# Patient Record
Sex: FEMALE | Race: WHITE | NOT HISPANIC OR LATINO | Employment: UNEMPLOYED | RURAL
[De-identification: names, ages, dates, MRNs, and addresses within clinical notes are randomized per-mention and may not be internally consistent; named-entity substitution may affect disease eponyms.]

---

## 2017-08-23 ENCOUNTER — OFFICE VISIT (OUTPATIENT)
Dept: CARDIOLOGY | Facility: CLINIC | Age: 42
End: 2017-08-23

## 2017-08-23 VITALS
SYSTOLIC BLOOD PRESSURE: 100 MMHG | WEIGHT: 214 LBS | DIASTOLIC BLOOD PRESSURE: 78 MMHG | HEIGHT: 62 IN | BODY MASS INDEX: 39.38 KG/M2 | HEART RATE: 84 BPM

## 2017-08-23 DIAGNOSIS — E78.49 OTHER HYPERLIPIDEMIA: ICD-10-CM

## 2017-08-23 DIAGNOSIS — R00.2 PALPITATIONS: Primary | ICD-10-CM

## 2017-08-23 DIAGNOSIS — Z79.4 TYPE 2 DIABETES MELLITUS WITHOUT COMPLICATION, WITH LONG-TERM CURRENT USE OF INSULIN (HCC): ICD-10-CM

## 2017-08-23 DIAGNOSIS — Z72.0 TOBACCO ABUSE: ICD-10-CM

## 2017-08-23 DIAGNOSIS — E11.9 TYPE 2 DIABETES MELLITUS WITHOUT COMPLICATION, WITH LONG-TERM CURRENT USE OF INSULIN (HCC): ICD-10-CM

## 2017-08-23 DIAGNOSIS — R07.89 OTHER CHEST PAIN: ICD-10-CM

## 2017-08-23 DIAGNOSIS — R06.02 SHORTNESS OF BREATH: ICD-10-CM

## 2017-08-23 DIAGNOSIS — I10 ESSENTIAL HYPERTENSION: ICD-10-CM

## 2017-08-23 PROBLEM — E78.5 HYPERLIPEMIA: Status: ACTIVE | Noted: 2017-08-23

## 2017-08-23 PROBLEM — R07.9 CHEST PAIN: Status: ACTIVE | Noted: 2017-08-23

## 2017-08-23 PROCEDURE — 99214 OFFICE O/P EST MOD 30 MIN: CPT | Performed by: NURSE PRACTITIONER

## 2017-08-23 PROCEDURE — 99406 BEHAV CHNG SMOKING 3-10 MIN: CPT | Performed by: NURSE PRACTITIONER

## 2017-08-23 RX ORDER — IBUPROFEN 800 MG/1
800 TABLET ORAL EVERY 6 HOURS PRN
COMMUNITY

## 2017-08-23 RX ORDER — GABAPENTIN 800 MG/1
800 TABLET ORAL 4 TIMES DAILY
COMMUNITY
End: 2018-02-28 | Stop reason: ALTCHOICE

## 2017-08-23 RX ORDER — NITROGLYCERIN 0.4 MG/1
TABLET SUBLINGUAL
Qty: 100 TABLET | Refills: 11 | Status: SHIPPED | OUTPATIENT
Start: 2017-08-23

## 2017-08-23 RX ORDER — BACLOFEN 10 MG/1
10 TABLET ORAL 2 TIMES DAILY
COMMUNITY

## 2017-08-23 RX ORDER — NICOTINE 21 MG/24HR
1 PATCH, TRANSDERMAL 24 HOURS TRANSDERMAL EVERY 24 HOURS
Qty: 28 PATCH | Refills: 1 | Status: SHIPPED | OUTPATIENT
Start: 2017-08-23 | End: 2018-02-28 | Stop reason: ALTCHOICE

## 2017-08-23 RX ORDER — LISINOPRIL 30 MG/1
30 TABLET ORAL DAILY
COMMUNITY
End: 2018-02-28 | Stop reason: DRUGHIGH

## 2017-08-23 NOTE — PROGRESS NOTES
Chief Complaint   Patient presents with   • Follow-up     For cardiac management.   • Chest Pain     She states having pressure in mid chest.   • Palpitations     States having a lot of palpitations, reports PCP started her on Lyrica, palpitations got worse. She reports has stopped the Lyrica. Last labs couple weeks ago at [facility].   • Shortness of Breath     She reports having increase in shortness of breath.   • Dizziness     she reports same as before.       Cardiac Complaints  palpitations      Subjective   Ayah Messer is a 42 y.o. female diabetes, hypertension, and palpitations.  Few years ago, she got admitted to [facility] and apparently underwent cardiac catheterization.  Details not available to me,  but according to her no intervention was not.  She had not had any symptoms for a while but she started noticing an increase in the palpitation and increasing chest tightness associated with palpitations and shortness of breath.  Holter done before consult showed PVCs.  At consult, coreg was added, since CRP was elevated lipids were treated aggressively with crestor increased to 20mg.  Cardiac workup with stress and echo were advised which showed no ischemic burden, good LVEF, and no significant valvular concerns. She returns today for follow up and reports she has been having more issues with shortness of breath which she states has worsened recently.  She does report having PFTs in the past with allergy and asthma, and was diagnosed with asthma at that time.  She also has a pain in the center of her chest late in the evening that will lasts a couple hours and then will go away. She has not used anything for intervention.  She was recently started on lyrica by PCP for control of neuropathy.  She states at that time her palpitations worsened and then have slowly gotten better since the discontinuation of medication.  She continues to have dizziness but this has been the same for sometime.  Labs are done with PCP as  well as refills.  She states her diabetes has not been well controlled and she will be following with endocrine soon, most recent AIC of over 9.  Labs showed recent elevation in triglycerides were elevated and medication changes were at that time. Unfortunately, she has not quit smoking and is still smoking about a pack a day.        Cardiac History  Past Surgical History:   Procedure Laterality Date   • CARDIAC CATHETERIZATION      ([place]) Normal as per pt   • CARDIOVASCULAR STRESS TEST  10/12/2016    EF 65%, no ischemia   •  SECTION     • CONVERTED (HISTORICAL) HOLTER  2016   • CONVERTED (HISTORICAL) HOLTER  2016    @[facility] HR 93 BPM. Frq PVC's (10.6%)   • ECHO - CONVERTED  10/12/2016    EF 55-60%, no AS, mild MR   • HYSTERECTOMY         Current Outpatient Prescriptions   Medication Sig Dispense Refill   • baclofen (LIORESAL) 10 MG tablet Take 10 mg by mouth 2 (Two) Times a Day.     • Canagliflozin (INVOKANA) 300 MG tablet Take 300 mg by mouth Daily.     • carvedilol (COREG) 6.25 MG tablet Take 1 tablet by mouth 2 (Two) Times a Day. 60 tablet 11   • gabapentin (NEURONTIN) 800 MG tablet Take 800 mg by mouth 4 (Four) Times a Day.     • ibuprofen (ADVIL,MOTRIN) 800 MG tablet Take 800 mg by mouth Every 6 (Six) Hours As Needed for Mild Pain .     • insulin aspart (novoLOG) 100 UNIT/ML injection Inject  under the skin 3 (Three) Times a Day Before Meals. Per sliding scale     • Insulin Glargine (BASAGLAR KWIKPEN SC) Inject 52 Units under the skin 2 (Two) Times a Day.     • lisinopril (PRINIVIL,ZESTRIL) 30 MG tablet Take 30 mg by mouth Daily.     • rosuvastatin (CRESTOR) 20 MG tablet Take 1 tablet by mouth Daily. 30 tablet 11   • vitamin D (ERGOCALCIFEROL) 89226 UNITS capsule capsule Take 50,000 Units by mouth 1 (One) Time Per Week. 4 times a week     • nicotine (EQ NICOTINE) 21 MG/24HR patch Place 1 patch on the skin Daily. 28 patch 1   • nitroglycerin (NITROSTAT) 0.4 MG SL tablet 1 under the  "tongue as needed for angina, may repeat q5mins for up three doses 100 tablet 11     No current facility-administered medications for this visit.        Codeine    Past Medical History:   Diagnosis Date   • Diabetes mellitus    • Diabetic neuropathy    • Hyperlipidemia    • Hypertension        Social History     Social History   • Marital status:      Spouse name: N/A   • Number of children: N/A   • Years of education: N/A     Occupational History   • Not on file.     Social History Main Topics   • Smoking status: Current Every Day Smoker     Packs/day: 1.00     Types: Cigarettes     Start date: 9/29/1995   • Smokeless tobacco: Never Used      Comment: Has tried chantex and patches, nothing helped.   • Alcohol use No   • Drug use: No   • Sexual activity: Not on file     Other Topics Concern   • Not on file     Social History Narrative       Family History   Problem Relation Age of Onset   • No Known Problems Mother    • Hypertension Father    • Diabetes Father    • Diabetes Sister        Review of Systems   Constitution: Negative for weakness and malaise/fatigue.   Cardiovascular: Positive for chest pain and palpitations. Negative for dyspnea on exertion, irregular heartbeat and syncope.   Respiratory: Positive for shortness of breath. Negative for wheezing.    Gastrointestinal: Negative for anorexia, flatus, heartburn and nausea.   Genitourinary: Negative for dysuria, hesitancy and nocturia.   Neurological: Positive for dizziness. Negative for focal weakness and light-headedness.   Psychiatric/Behavioral: Negative for altered mental status and depression.       DiabetesYes  Thyroidnormal    Objective     /78 (BP Location: Right arm)  Pulse 84  Ht 62\" (157.5 cm)  Wt 214 lb (97.1 kg)  BMI 39.14 kg/m2    Physical Exam   Constitutional: She is oriented to person, place, and time. She appears well-developed and well-nourished.   HENT:   Head: Normocephalic and atraumatic.   Eyes: EOM are normal. Pupils " are equal, round, and reactive to light.   Neck: Normal range of motion. Neck supple.   Cardiovascular: Normal rate and regular rhythm.    Murmur heard.  Pulmonary/Chest: Effort normal and breath sounds normal.   Abdominal: Soft.   Musculoskeletal: Normal range of motion.   Neurological: She is alert and oriented to person, place, and time.   Skin: Skin is warm and dry.   Psychiatric: She has a normal mood and affect. Her behavior is normal.       Procedures    Assessment/Plan     HR and BP are stable at present.  Most recent stress findings which showed normal LV function without ischemic burden and echo which good showed LVEF and valvular function discussed with patient.  She does have more palpitations than before and says that she feels like most of the cardiac symptoms she is having is related to this. Event recorder will be advised since she is having more palpitations than before to rule out any runs of SVT or more frequent PVCs that may be causing concerns.  She does admit to a lot of caffeine use and was encouraged to limit her intake.  Chest pain she reports is very atypical and comes in the evening as she is resting.  She was encouraged to discuss with you about GI referral as she reports esophageal yeast in the past and is also a diabetic, so unsure if gastroparesis is playing a roll.  NTG SL will be given to patient to use as needed for chest discomfort.  She has unfortunately continued to smoke and we discussed smoking cessation for greater than 3 minutes.  Nicotine patches will be sent as she is willing to try.  Patient does also report history of asthma and has had PFTs in the past but it has been sometime.  She was encouraged to discuss repeat PFTs with you.  She says diabetes has been hard to control and will be seeing endocrinology soon in regards.  Good cardiac ADA diet with limited carbs advised.  6 month follow up advised or sooner if needed.  If problems should continue, she is advised to call  for futher recommendations.      Problems Addressed this Visit        Cardiovascular and Mediastinum    Palpitations - Primary    Relevant Orders    Cardiac Event Monitor    HTN (hypertension)    Relevant Medications    lisinopril (PRINIVIL,ZESTRIL) 30 MG tablet    Hyperlipemia       Respiratory    Shortness of breath       Endocrine    Type 2 diabetes mellitus without complication, with long-term current use of insulin    Relevant Medications    insulin aspart (novoLOG) 100 UNIT/ML injection    Insulin Glargine (BASAGLAR KWIKPEN SC)       Nervous and Auditory    Chest pain       Other    Tobacco abuse    Relevant Medications    nicotine (EQ NICOTINE) 21 MG/24HR patch                  Electronically signed by LUIS Rollins August 23, 2017 5:39 PM

## 2017-08-31 ENCOUNTER — TELEPHONE (OUTPATIENT)
Dept: CARDIOLOGY | Facility: CLINIC | Age: 42
End: 2017-08-31

## 2017-08-31 DIAGNOSIS — R00.2 PALPITATION: ICD-10-CM

## 2017-08-31 DIAGNOSIS — I49.3 PVC (PREMATURE VENTRICULAR CONTRACTION): Primary | ICD-10-CM

## 2017-09-06 DIAGNOSIS — R00.2 PALPITATION: ICD-10-CM

## 2017-09-06 DIAGNOSIS — I49.3 PVC (PREMATURE VENTRICULAR CONTRACTION): ICD-10-CM

## 2017-09-15 ENCOUNTER — OUTSIDE FACILITY SERVICE (OUTPATIENT)
Dept: CARDIOLOGY | Facility: CLINIC | Age: 42
End: 2017-09-15

## 2017-09-15 PROCEDURE — 93272 ECG/REVIEW INTERPRET ONLY: CPT | Performed by: INTERNAL MEDICINE

## 2017-09-19 RX ORDER — FLECAINIDE ACETATE 50 MG/1
50 TABLET ORAL 2 TIMES DAILY
Qty: 60 TABLET | Refills: 3 | Status: SHIPPED | OUTPATIENT
Start: 2017-09-19 | End: 2017-09-19 | Stop reason: SDUPTHER

## 2017-09-19 RX ORDER — FLECAINIDE ACETATE 50 MG/1
50 TABLET ORAL 2 TIMES DAILY
Qty: 60 TABLET | Refills: 3 | Status: SHIPPED | OUTPATIENT
Start: 2017-09-19 | End: 2018-02-14 | Stop reason: SDUPTHER

## 2017-09-22 ENCOUNTER — TELEPHONE (OUTPATIENT)
Dept: CARDIOLOGY | Facility: CLINIC | Age: 42
End: 2017-09-22

## 2017-09-22 NOTE — TELEPHONE ENCOUNTER
After reviewing EKG from outside clinic. Patient is in NSR with first degree block and normal QT.  As long as her palpitations have improved, we will continue with current tambocor dosing at 50mg BID.

## 2017-09-25 NOTE — TELEPHONE ENCOUNTER
Patient notified of recommendations to continue Tambocor 50mg bid. Verbalizes understanding of instruction's.

## 2018-02-16 RX ORDER — FLECAINIDE ACETATE 50 MG/1
TABLET ORAL
Qty: 60 TABLET | Refills: 3 | Status: SHIPPED | OUTPATIENT
Start: 2018-02-16 | End: 2018-09-16 | Stop reason: SDUPTHER

## 2018-02-28 ENCOUNTER — OFFICE VISIT (OUTPATIENT)
Dept: CARDIOLOGY | Facility: CLINIC | Age: 43
End: 2018-02-28

## 2018-02-28 VITALS
HEIGHT: 62 IN | WEIGHT: 217 LBS | SYSTOLIC BLOOD PRESSURE: 110 MMHG | HEART RATE: 71 BPM | DIASTOLIC BLOOD PRESSURE: 74 MMHG | BODY MASS INDEX: 39.93 KG/M2

## 2018-02-28 DIAGNOSIS — E83.42 HYPOMAGNESEMIA: ICD-10-CM

## 2018-02-28 DIAGNOSIS — Z72.0 TOBACCO ABUSE: ICD-10-CM

## 2018-02-28 DIAGNOSIS — R07.89 CHEST PRESSURE: Primary | ICD-10-CM

## 2018-02-28 DIAGNOSIS — Z79.4 TYPE 2 DIABETES MELLITUS WITHOUT COMPLICATION, WITH LONG-TERM CURRENT USE OF INSULIN (HCC): ICD-10-CM

## 2018-02-28 DIAGNOSIS — R06.02 SHORTNESS OF BREATH: ICD-10-CM

## 2018-02-28 DIAGNOSIS — E78.00 HYPERCHOLESTEREMIA: ICD-10-CM

## 2018-02-28 DIAGNOSIS — E88.81 METABOLIC SYNDROME: ICD-10-CM

## 2018-02-28 DIAGNOSIS — R00.2 PALPITATIONS: ICD-10-CM

## 2018-02-28 DIAGNOSIS — I10 ESSENTIAL HYPERTENSION: ICD-10-CM

## 2018-02-28 DIAGNOSIS — E11.9 TYPE 2 DIABETES MELLITUS WITHOUT COMPLICATION, WITH LONG-TERM CURRENT USE OF INSULIN (HCC): ICD-10-CM

## 2018-02-28 PROBLEM — E78.5 HYPERLIPEMIA: Status: RESOLVED | Noted: 2017-08-23 | Resolved: 2018-02-28

## 2018-02-28 PROCEDURE — 99214 OFFICE O/P EST MOD 30 MIN: CPT | Performed by: INTERNAL MEDICINE

## 2018-02-28 PROCEDURE — 93000 ELECTROCARDIOGRAM COMPLETE: CPT | Performed by: INTERNAL MEDICINE

## 2018-02-28 RX ORDER — FLUCONAZOLE 200 MG/1
200 TABLET ORAL DAILY PRN
COMMUNITY
End: 2023-02-23

## 2018-02-28 RX ORDER — PREGABALIN 100 MG/1
200 CAPSULE ORAL 3 TIMES DAILY
COMMUNITY

## 2018-02-28 RX ORDER — LISINOPRIL 10 MG/1
10 TABLET ORAL DAILY
COMMUNITY
End: 2018-05-01 | Stop reason: DRUGHIGH

## 2018-02-28 RX ORDER — FLUOXETINE HYDROCHLORIDE 20 MG/1
20 CAPSULE ORAL DAILY
COMMUNITY
End: 2023-02-23

## 2018-02-28 RX ORDER — HYDROCHLOROTHIAZIDE 25 MG/1
25 TABLET ORAL DAILY
COMMUNITY

## 2018-02-28 RX ORDER — ALBUTEROL SULFATE 90 UG/1
2 AEROSOL, METERED RESPIRATORY (INHALATION) EVERY 4 HOURS PRN
COMMUNITY

## 2018-02-28 RX ORDER — TRAMADOL HYDROCHLORIDE 50 MG/1
50 TABLET ORAL 2 TIMES DAILY PRN
COMMUNITY

## 2018-03-01 NOTE — PROGRESS NOTES
Body mass index is 39.68 kg/(m^2).  Talked to her about diet exercise and weight reduction.  Talked to her about different diets that she can use.

## 2018-04-30 ENCOUNTER — TELEPHONE (OUTPATIENT)
Dept: CARDIOLOGY | Facility: CLINIC | Age: 43
End: 2018-04-30

## 2018-05-01 RX ORDER — LISINOPRIL 10 MG/1
10 TABLET ORAL 2 TIMES DAILY
Qty: 60 TABLET | Refills: 6 | Status: SHIPPED | OUTPATIENT
Start: 2018-05-01

## 2018-05-03 ENCOUNTER — TELEPHONE (OUTPATIENT)
Dept: CARDIOLOGY | Facility: CLINIC | Age: 43
End: 2018-05-03

## 2018-05-03 NOTE — TELEPHONE ENCOUNTER
Patient called reporting that since increasing lisinopril to BID per stress recommendations she has not noticed an improvement in symptoms: still having shortness of breath, pressure in chest, headache, fatigue. Your recommendation was to schedule for elective cath if symptoms persist. Okay to go ahead and schedule?

## 2018-05-03 NOTE — TELEPHONE ENCOUNTER
I spoke with patient.  She does want to proceed with cardiac catheterization.  I offered to schedule on 5/10/18.  Patient wants to wait until 5/17/18.  I will schedule, PA with [facility], and notify patient of instructions.

## 2018-05-17 ENCOUNTER — OUTSIDE FACILITY SERVICE (OUTPATIENT)
Dept: CARDIOLOGY | Facility: CLINIC | Age: 43
End: 2018-05-17

## 2018-05-17 PROCEDURE — 93458 L HRT ARTERY/VENTRICLE ANGIO: CPT | Performed by: INTERNAL MEDICINE

## 2018-09-17 RX ORDER — FLECAINIDE ACETATE 50 MG/1
50 TABLET ORAL 2 TIMES DAILY
Qty: 60 TABLET | Refills: 0 | Status: SHIPPED | OUTPATIENT
Start: 2018-09-17

## 2023-02-23 ENCOUNTER — OFFICE VISIT (OUTPATIENT)
Dept: PODIATRY | Facility: CLINIC | Age: 48
End: 2023-02-23
Payer: MEDICAID

## 2023-02-23 VITALS
OXYGEN SATURATION: 100 % | DIASTOLIC BLOOD PRESSURE: 82 MMHG | HEIGHT: 62 IN | WEIGHT: 146 LBS | TEMPERATURE: 97.8 F | HEART RATE: 81 BPM | SYSTOLIC BLOOD PRESSURE: 121 MMHG | BODY MASS INDEX: 26.87 KG/M2

## 2023-02-23 DIAGNOSIS — E11.42 TYPE 2 DIABETES MELLITUS WITH DIABETIC POLYNEUROPATHY, WITH LONG-TERM CURRENT USE OF INSULIN: Primary | ICD-10-CM

## 2023-02-23 DIAGNOSIS — E11.8 DIABETIC FOOT: ICD-10-CM

## 2023-02-23 DIAGNOSIS — G62.9 NEUROPATHY: ICD-10-CM

## 2023-02-23 DIAGNOSIS — Z79.4 TYPE 2 DIABETES MELLITUS WITH DIABETIC POLYNEUROPATHY, WITH LONG-TERM CURRENT USE OF INSULIN: Primary | ICD-10-CM

## 2023-02-23 PROCEDURE — 99203 OFFICE O/P NEW LOW 30 MIN: CPT | Performed by: PODIATRIST

## 2023-02-23 PROCEDURE — G8404 LOW EXTEMITY NEUR EXAM DOCUM: HCPCS | Performed by: PODIATRIST

## 2023-02-23 RX ORDER — ESTRADIOL 0.1 MG/G
CREAM VAGINAL
COMMUNITY

## 2023-02-23 RX ORDER — PANTOPRAZOLE SODIUM 40 MG/1
1 TABLET, DELAYED RELEASE ORAL DAILY
COMMUNITY
Start: 2022-12-20

## 2023-02-23 RX ORDER — MONTELUKAST SODIUM 10 MG/1
1 TABLET ORAL DAILY
COMMUNITY
Start: 2023-01-14

## 2023-02-23 RX ORDER — ONDANSETRON 8 MG/1
TABLET, ORALLY DISINTEGRATING ORAL
COMMUNITY
Start: 2023-01-24

## 2023-02-23 RX ORDER — DOXEPIN HYDROCHLORIDE 10 MG/1
10 CAPSULE ORAL
COMMUNITY
Start: 2023-02-16

## 2023-02-23 RX ORDER — FAMOTIDINE 40 MG/1
TABLET, FILM COATED ORAL
COMMUNITY

## 2023-02-23 RX ORDER — TIRZEPATIDE 10 MG/.5ML
INJECTION, SOLUTION SUBCUTANEOUS
COMMUNITY

## 2023-02-23 RX ORDER — LANCETS 30 GAUGE
EACH MISCELLANEOUS
COMMUNITY

## 2023-02-23 RX ORDER — LEVOCETIRIZINE DIHYDROCHLORIDE 5 MG/1
5 TABLET, FILM COATED ORAL EVERY EVENING
COMMUNITY
Start: 2023-01-14

## 2023-02-23 RX ORDER — FLUCONAZOLE 150 MG/1
TABLET ORAL
COMMUNITY
End: 2023-02-23

## 2023-02-23 RX ORDER — INSULIN LISPRO 100 [IU]/ML
INJECTION, SOLUTION INTRAVENOUS; SUBCUTANEOUS
COMMUNITY

## 2023-02-23 RX ORDER — HYDROXYZINE HYDROCHLORIDE 25 MG/1
TABLET, FILM COATED ORAL
COMMUNITY
Start: 2023-01-14

## 2023-02-23 RX ORDER — POLYETHYLENE GLYCOL 3350, SODIUM SULFATE ANHYDROUS, SODIUM BICARBONATE, SODIUM CHLORIDE, POTASSIUM CHLORIDE 236; 22.74; 6.74; 5.86; 2.97 G/4L; G/4L; G/4L; G/4L; G/4L
POWDER, FOR SOLUTION ORAL SEE ADMIN INSTRUCTIONS
COMMUNITY
Start: 2023-01-24

## 2023-02-23 RX ORDER — ONDANSETRON 4 MG/1
TABLET, FILM COATED ORAL
COMMUNITY
End: 2023-02-23 | Stop reason: DRUGHIGH

## 2023-02-23 RX ORDER — DICYCLOMINE HCL 20 MG
1 TABLET ORAL EVERY 6 HOURS
COMMUNITY
Start: 2023-01-24

## 2023-02-23 RX ORDER — ATOMOXETINE 40 MG/1
40 CAPSULE ORAL EVERY MORNING
COMMUNITY
Start: 2022-12-02

## 2023-02-23 RX ORDER — PROMETHAZINE HYDROCHLORIDE 25 MG/1
TABLET ORAL
COMMUNITY

## 2023-02-23 RX ORDER — QUETIAPINE FUMARATE 50 MG/1
TABLET, FILM COATED ORAL
COMMUNITY

## 2023-02-23 RX ORDER — ACYCLOVIR 400 MG/1
TABLET ORAL
COMMUNITY
Start: 2023-01-13

## 2023-02-23 RX ORDER — PRAZOSIN HYDROCHLORIDE 5 MG/1
10 CAPSULE ORAL NIGHTLY
COMMUNITY
Start: 2023-01-26

## 2023-02-23 RX ORDER — ADALIMUMAB 40MG/0.4ML
KIT SUBCUTANEOUS
COMMUNITY
Start: 2023-02-21

## 2023-02-23 RX ORDER — OXYBUTYNIN CHLORIDE 10 MG/1
TABLET, EXTENDED RELEASE ORAL
COMMUNITY
Start: 2023-02-01

## 2023-02-23 RX ORDER — PROCHLORPERAZINE 25 MG/1
SUPPOSITORY RECTAL
COMMUNITY
Start: 2023-02-22

## 2023-02-23 RX ORDER — FOLIC ACID 1 MG/1
1 TABLET ORAL DAILY
COMMUNITY
Start: 2023-02-07

## 2023-02-23 RX ORDER — PROCHLORPERAZINE 25 MG/1
SUPPOSITORY RECTAL
COMMUNITY

## 2023-02-23 RX ORDER — VORTIOXETINE 20 MG/1
1 TABLET, FILM COATED ORAL DAILY
COMMUNITY
Start: 2022-12-04

## 2023-02-23 NOTE — PROGRESS NOTES
[facility] - PODIATRY    Today's Date: 23    Patient Name: Ayah Messer  MRN: [id number]  CSN: [id number]  PCP: Erika Leija MD, Last PCP Visit:  12/15/2022  Referring Provider: Erika Leija MD    SUBJECTIVE     Chief Complaint   Patient presents with   • Left Foot - Establish Care, Diabetes, Peripheral Neuropathy   • Right Foot - Diabetes, Establish Care, Peripheral Neuropathy     HPI: Ayah Messer, a 47 y.o.female, presents to clinic for a diabetic foot evaluation.    New, Established, New Problem: New    Onset: Insidious    Nature: IDDM    Stable, worsening, improving: Improving, patient states that her blood sugars are slightly improved.  She has had a gastric sleeve surgery    Patient controlling diabetes via: Insulin    Patient states there last blood glucose was: 105 via CGM, Dexcom    Patient denies any fevers, chills, nausea, vomiting, shortness of breath, nor any other constitutional signs nor symptoms.    Patient relates numbness in her feet.    Past Medical History:   Diagnosis Date   • Diabetes mellitus (HCC)    • Diabetic neuropathy (HCC)    • Hyperlipidemia    • Hypertension    • OA (osteoarthritis)    • Osteopenia    • Ulcerative colitis (HCC)      Past Surgical History:   Procedure Laterality Date   • ANKLE GANGLION CYST EXCISION Right    • CARDIAC CATHETERIZATION      ([place]) Normal as per pt   • CARDIAC CATHETERIZATION  2018    Normal Coronaries   • CARDIOVASCULAR STRESS TEST  10/12/2016    L. Cardiolite- Negative   • CARDIOVASCULAR STRESS TEST  2018    @ [facility]. 7 Min, 80% THR. BP- 210/80. R/O Anterior Ischemia   •  SECTION     • CONVERTED (HISTORICAL) HOLTER  2016    @[facility]- AVG HR 93 BPM. Frq PVC's (10.6%)   • ECHO - CONVERTED  10/12/2016    EF 55-60%, no AS, mild MR   • ECHO - CONVERTED  2018    @ [facility]. EF 60%. Mild MR & AI   • GASTRIC SLEEVE LAPAROSCOPIC     • HYSTERECTOMY     • LUMBAR LAMINECTOMY DISCECTOMY DECOMPRESSION       Family  History   Problem Relation Age of Onset   • No Known Problems Mother    • Hypertension Father    • Diabetes Father    • Diabetes Sister    • Osteoporosis Maternal Grandmother    • Cancer Maternal Grandmother    • Heart attack Paternal Grandfather      Social History     Socioeconomic History   • Marital status:    Tobacco Use   • Smoking status: Every Day     Packs/day: 0.25     Types: Cigarettes     Start date: 9/29/1995   • Smokeless tobacco: Never   • Tobacco comments:     Has tried chantex and patches, nothing helped.   Substance and Sexual Activity   • Alcohol use: No   • Drug use: No   • Sexual activity: Defer     Allergies   Allergen Reactions   • Codeine Itching, Hives, Rash, Unknown (See Comments) and GI Intolerance   • Adhesive Tape Rash     Current Outpatient Medications   Medication Sig Dispense Refill   • acyclovir (ZOVIRAX) 400 MG tablet TAKE 1 TABLET BY MOUTH FIVE TIMES DAILY FOR 7 DAYS     • albuterol (PROVENTIL HFA;VENTOLIN HFA) 108 (90 Base) MCG/ACT inhaler Inhale 2 puffs Every 4 (Four) Hours As Needed for Wheezing.     • Aspirin 81 MG capsule 1qd     • atomoxetine (STRATTERA) 40 MG capsule Take 40 mg by mouth Every Morning.     • Continuous Blood Gluc Sensor (Dexcom G6 Sensor) Dexcom G6 Sensor device   CHANGE EVERY 10 DAYS AS DIRECTED     • Continuous Blood Gluc Transmit (Dexcom G6 Transmitter) misc      • Cyanocobalamin 1000 MCG/ML kit Vitamin B12     • dicyclomine (BENTYL) 20 MG tablet Take 1 tablet by mouth Every 6 (Six) Hours.     • doxepin (SINEquan) 10 MG capsule Take 10 mg by mouth every night at bedtime.     • estradiol (ESTRACE) 0.1 MG/GM vaginal cream estradiol 0.01% (0.1 mg/gram) vaginal cream     • famotidine (PEPCID) 40 MG tablet famotidine 40 mg tablet   TAKE 1 TABLET BY MOUTH AT BEDTIME     • folic acid (FOLVITE) 1 MG tablet Take 1 tablet by mouth Daily.     • glucose blood test strip OneTouch Verio test strips     • Humira Pen 40 MG/0.4ML Pen-injector Kit      • hydrOXYzine  "(ATARAX) 25 MG tablet TAKE ONE TO TWO TABLETS BY MOUTH ONCE DAILY AT BEDTIME     • ibuprofen (ADVIL,MOTRIN) 800 MG tablet Take 800 mg by mouth Every 6 (Six) Hours As Needed for Mild Pain .     • insulin aspart (novoLOG) 100 UNIT/ML injection Inject  under the skin 3 (Three) Times a Day Before Meals. Per sliding scale     • Insulin Lispro, 1 Unit Dial, (HUMALOG) 100 UNIT/ML solution pen-injector Humalog KwikPen (U-100) Insulin 100 unit/mL subcutaneous     • Insulin Pen Needle 31G X 5 MM misc BD Ultra Fine II Insulin Syringe 0.5 mL 31 gauge x 5/16\"     • Lancets (OneTouch Delica Plus [id number]) misc OneTouch Delica Plus Lancet 30 gauge     • levocetirizine (XYZAL) 5 MG tablet Take 5 mg by mouth Every Evening.     • methotrexate 2.5 MG tablet methotrexate sodium 2.5 mg tablet   TAKE 8 TABLETS BY MOUTH ONCE A WEEK     • montelukast (SINGULAIR) 10 MG tablet Take 1 tablet by mouth Daily.     • nitroglycerin (NITROSTAT) 0.4 MG SL tablet 1 under the tongue as needed for angina, may repeat q5mins for up three doses 100 tablet 11   • ondansetron ODT (ZOFRAN-ODT) 8 MG disintegrating tablet DISSOLVE 1 TABLET IN MOUTH EVERY 8 HOURS AS NEEDED     • oxybutynin XL (DITROPAN-XL) 10 MG 24 hr tablet      • pantoprazole (PROTONIX) 40 MG EC tablet Take 1 tablet by mouth Daily.     • PEG 3350-KCl-NaBcb-NaCl-NaSulf (PEG-3350/Electrolytes) 236 g reconstituted solution See Admin Instructions.     • prazosin (MINIPRESS) 5 MG capsule Take 10 mg by mouth Every Night.     • pregabalin (LYRICA) 100 MG capsule Take 200 mg by mouth 3 (Three) Times a Day.     • promethazine (PHENERGAN) 25 MG tablet promethazine 25 mg tablet     • QUEtiapine (SEROquel) 50 MG tablet quetiapine 50 mg tablet     • Tirzepatide (Mounjaro) 10 MG/0.5ML solution pen-injector Mounjaro 10 mg/0.5 mL subcutaneous pen injector     • Trintellix 20 MG tablet Take 1 tablet by mouth Daily.     • vitamin D (ERGOCALCIFEROL) 54936 UNITS capsule capsule Take 50,000 Units by mouth 1 " (One) Time Per Week. 4 times a week     • baclofen (LIORESAL) 10 MG tablet Take 10 mg by mouth 2 (Two) Times a Day.     • carvedilol (COREG) 6.25 MG tablet Take 1 tablet by mouth 2 (Two) Times a Day. 60 tablet 11   • flecainide (TAMBOCOR) 50 MG tablet Take 1 tablet by mouth 2 (Two) Times a Day. NEED TO SCHEDULE APPOINTMENT 60 tablet 0   • hydrochlorothiazide (HYDRODIURIL) 25 MG tablet Take 25 mg by mouth Daily.     • Insulin Glargine (BASAGLAR KWIKPEN SC) Inject 65 Units under the skin Daily.     • lisinopril (PRINIVIL,ZESTRIL) 10 MG tablet Take 1 tablet by mouth 2 (Two) Times a Day. 60 tablet 6   • metFORMIN (GLUCOPHAGE) 1000 MG tablet Take 1,000 mg by mouth 2 (Two) Times a Day With Meals.     • rosuvastatin (CRESTOR) 20 MG tablet Take 1 tablet by mouth Daily. 30 tablet 11   • traMADol (ULTRAM) 50 MG tablet Take 50 mg by mouth 2 (Two) Times a Day As Needed for Moderate Pain .       No current facility-administered medications for this visit.     Review of Systems   Neurological: Positive for numbness.   All other systems reviewed and are negative.      OBJECTIVE     Vitals:    02/23/23 0856   BP: 121/82   Pulse: 81   Temp: 97.8 °F (36.6 °C)   SpO2: 100%       Body mass index is 26.7 kg/m².    No results found for: HGBA1C    No results found for: GLUCOSE, CALCIUM, NA, K, CO2, CL, BUN, CREATININE, EGFRIFAFRI, EGFRIFNONA, BCR, ANIONGAP    Patient seen in no apparent distress.      PHYSICAL EXAM:     Foot/Ankle Exam:       General:   Diabetic Foot Exam Performed    Appearance: appears stated age and healthy    Orientation: AAOx3    Affect: appropriate    Gait: unimpaired    Shoe Gear:  Casual shoes    VASCULAR      Right Foot Vascularity   Normal vascular exam    Dorsalis pedis:  2+  Posterior tibial:  2+  Skin Temperature: warm    Edema Grading:  None  CFT:  < 3 seconds  Pedal Hair Growth:  Absent  Varicosities: mild varicosities       Left Foot Vascularity   Normal vascular exam    Dorsalis pedis:  2+  Posterior  tibial:  2+  Skin Temperature: warm    Edema Grading:  None  CFT:  < 3 seconds  Pedal Hair Growth:  Absent  Varicosities: mild varicosities        NEUROLOGIC     Right Foot Neurologic   Light touch sensation:  Absent  Vibratory sensation:  Absent  Hot/Cold sensation: absent    Protective Sensation using Buchanan-Megan Monofilament:  0     Left Foot Neurologic   Light touch sensation:  Absent  Vibratory sensation:  Absent  Hot/cold sensation: absent    Protective Sensation using Buchanan-Megan Monofilament:  0     MUSCLE STRENGTH     Right Foot Muscle Strength   Foot dorsiflexion:  4  Foot plantar flexion:  4  Foot inversion:  4-  Foot eversion:  4-     Left Foot Muscle Strength   Foot dorsiflexion:  4  Foot plantar flexion:  4  Foot inversion:  4-  Foot eversion:  4-     RANGE OF MOTION      Right Foot Range of Motion   Foot and ankle ROM within normal limits       Left Foot Range of Motion   Foot and ankle ROM within normal limits       DERMATOLOGIC     Right Foot Dermatologic   Skin: skin intact    Nails: normal       Left Foot Dermatologic   Skin: skin intact    Nails: normal        Diabetic Foot Exam Performed      ASSESSMENT/PLAN     Diagnoses and all orders for this visit:    1. Type 2 diabetes mellitus with diabetic polyneuropathy, with long-term current use of insulin (HCC) (Primary)    2. Neuropathy    3. Diabetic foot (HCC)        Prescription was written for diabetic shoes.    Comprehensive lower extremity examination and evaluation was performed.    Discussed findings and treatment plan including risks, benefits, and treatment options with patient in detail. Patient agreed with treatment plan.    Medications and allergies reviewed.  Reviewed available blood glucose and HgB A1C lab values along with other pertinent labs.  These were discussed with the patient as to their importance of diabetic maintenance.    Diabetic foot exam performed and documented this date, compliant with [facility] required standards.  Detail of findings as noted in physical exam.  Lower extremity Neurologic exam for diabetic patient performed and documented this date, compliant with PQRS required standards. Detail of findings as noted in physical exam.  Advised patient importance of good routine lower extremity hygiene. Advised patient importance of evaluating for intact skin and pain free nail borders.  Advised patient to use mirror to evaluate plantar/ soles of feet for better visualization. Advised patient monitor and phone office to be seen if any cracking to skin, open lesions, painful nail borders or if nails become elongated prior to next visit. Advised patient importance of daily cleansing of lower extremities, followed by good skin cream to maintain normal hydration of skin. Also advised patient importance of close daily monitoring of blood sugar. Advised to regulate diet and medications to maintain control of blood sugar in optimal range. Contact primary care provider if difficulties maintaining blood sugar levels.  Advised Patient of presence of Diabetes Mellitus condition.  Advised Patient risk of progression and worsening or improvement, then return of condition.  Will monitor condition for any change in future. Treat with most appropriate treatment pending status of condition.  Counseled and advised patient extensively on nature and ramifications of diabetes. Standard instructions given to patient for good diabetic foot care and maintenance. Advised importance of careful monitoring to avoid break down and complications secondary to diabetes. Advised patient importance of strict maintenance of blood sugar control. Advised patient of possible ominous results from neglect of condition, i.e.: amputation/ loss of digits, feet and legs, or even death.  Patient states understands counseling, will monitor closely, continue good hygiene and routine diabetic foot care. Patient will contact office is questions or problems.      An After Visit  Summary was printed and given to the patient at discharge, including (if requested) any available informative/educational handouts regarding diagnosis, treatment, or medications. All questions were answered to patient/family satisfaction. Should symptoms fail to improve or worsen they agree to call or return to clinic or to go to the Emergency Department. Discussed the importance of following up with any needed screening tests/labs/specialist appointments and any requested follow-up recommended by me today. Importance of maintaining follow-up discussed and patient accepts that missed appointments can delay diagnosis and potentially lead to worsening of conditions.    Return in about 1 year (around 2/23/2024) for Podiatry Diabetic Foot Exam., or sooner if acute issues arise.    This document has been electronically signed by Mason Grace DPM on February 23, 2023 09:36 EST